# Patient Record
Sex: FEMALE | Race: WHITE | Employment: UNEMPLOYED | URBAN - METROPOLITAN AREA
[De-identification: names, ages, dates, MRNs, and addresses within clinical notes are randomized per-mention and may not be internally consistent; named-entity substitution may affect disease eponyms.]

---

## 2017-04-28 ENCOUNTER — TELEPHONE (OUTPATIENT)
Dept: FAMILY MEDICINE | Facility: CLINIC | Age: 47
End: 2017-04-28

## 2017-04-28 NOTE — TELEPHONE ENCOUNTER
4/28/2017    Call Regarding Preventive Health Screening Cervical/PAP    Attempt 1    Message on voicemail     Comments:         Outreach   Barbie Martinez

## 2017-06-09 NOTE — TELEPHONE ENCOUNTER
6/9/2017    Call Regarding Preventive Health Screening Cervical/PAP    Attempt 2    Message on voicemail     Comments:           Outreach   MITCH

## 2017-07-22 ENCOUNTER — HEALTH MAINTENANCE LETTER (OUTPATIENT)
Age: 47
End: 2017-07-22

## 2017-07-25 ENCOUNTER — OFFICE VISIT (OUTPATIENT)
Dept: INTERNAL MEDICINE | Facility: CLINIC | Age: 47
End: 2017-07-25
Payer: COMMERCIAL

## 2017-07-25 ENCOUNTER — TELEPHONE (OUTPATIENT)
Dept: FAMILY MEDICINE | Facility: CLINIC | Age: 47
End: 2017-07-25

## 2017-07-25 VITALS
WEIGHT: 151 LBS | HEART RATE: 107 BPM | SYSTOLIC BLOOD PRESSURE: 132 MMHG | DIASTOLIC BLOOD PRESSURE: 68 MMHG | BODY MASS INDEX: 28.77 KG/M2 | OXYGEN SATURATION: 97 % | RESPIRATION RATE: 14 BRPM | TEMPERATURE: 97.3 F

## 2017-07-25 DIAGNOSIS — F17.200 TOBACCO USE DISORDER: ICD-10-CM

## 2017-07-25 DIAGNOSIS — D50.9 IRON DEFICIENCY ANEMIA, UNSPECIFIED IRON DEFICIENCY ANEMIA TYPE: ICD-10-CM

## 2017-07-25 DIAGNOSIS — J20.9 ACUTE BRONCHITIS WITH SYMPTOMS > 10 DAYS: ICD-10-CM

## 2017-07-25 DIAGNOSIS — E05.90 THYROTOXICOSIS WITHOUT THYROID STORM, UNSPECIFIED THYROTOXICOSIS TYPE: Primary | ICD-10-CM

## 2017-07-25 LAB
ERYTHROCYTE [DISTWIDTH] IN BLOOD BY AUTOMATED COUNT: 13.5 % (ref 10–15)
HCT VFR BLD AUTO: 46.8 % (ref 35–47)
HGB BLD-MCNC: 15.3 G/DL (ref 11.7–15.7)
MCH RBC QN AUTO: 28.1 PG (ref 26.5–33)
MCHC RBC AUTO-ENTMCNC: 32.7 G/DL (ref 31.5–36.5)
MCV RBC AUTO: 86 FL (ref 78–100)
PLATELET # BLD AUTO: 190 10E9/L (ref 150–450)
RBC # BLD AUTO: 5.44 10E12/L (ref 3.8–5.2)
T4 FREE SERPL-MCNC: 3.5 NG/DL (ref 0.76–1.46)
TSH SERPL DL<=0.05 MIU/L-ACNC: <0.01 MU/L (ref 0.4–4)
WBC # BLD AUTO: 6.7 10E9/L (ref 4–11)

## 2017-07-25 PROCEDURE — 85027 COMPLETE CBC AUTOMATED: CPT | Performed by: INTERNAL MEDICINE

## 2017-07-25 PROCEDURE — 36415 COLL VENOUS BLD VENIPUNCTURE: CPT | Performed by: INTERNAL MEDICINE

## 2017-07-25 PROCEDURE — 84439 ASSAY OF FREE THYROXINE: CPT | Performed by: INTERNAL MEDICINE

## 2017-07-25 PROCEDURE — 84443 ASSAY THYROID STIM HORMONE: CPT | Performed by: INTERNAL MEDICINE

## 2017-07-25 PROCEDURE — 99213 OFFICE O/P EST LOW 20 MIN: CPT | Performed by: INTERNAL MEDICINE

## 2017-07-25 RX ORDER — AZITHROMYCIN 250 MG/1
TABLET, FILM COATED ORAL
Qty: 6 TABLET | Refills: 0 | Status: SHIPPED | OUTPATIENT
Start: 2017-07-25 | End: 2017-08-16

## 2017-07-25 ASSESSMENT — PAIN SCALES - GENERAL: PAINLEVEL: NO PAIN (0)

## 2017-07-25 NOTE — PROGRESS NOTES
SUBJECTIVE:                                                    Alessia Ramirez is a 46 year old female who presents to clinic today for the following health issues:    Acute Illness   Acute illness concerns: cough  Onset: last Thursday 20th    Fever: no     Chills/Sweats: YES    Headache (location?): YES    Sinus Pressure:no    Conjunctivitis:  no    Ear Pain: YES- itchy    Rhinorrhea: YES    Congestion: YES    Sore Throat: no      Cough: YES    Wheeze: YES    Decreased Appetite: no     Nausea: no     Vomiting: YES    Diarrhea:  no     Dysuria/Freq.: no     Fatigue/Achiness: YES    Sick/Strep Exposure: no      Therapies Tried and outcome: nothing    Sob at work, been a week or so.  Some wheeze, not much sputum.      Past Medical History:   Diagnosis Date     Grave's disease 2007     IRON DEFIC ANEMIA NOS 6/11/2007    Suggested iron tabs     Current Outpatient Prescriptions   Medication     loratadine (ALLERGY RELIEF) 10 MG tablet     methimazole (TAPAZOLE) 10 MG tablet     Blood Pressure Monitoring (BLOOD PRESSURE MONITOR/M CUFF) MISC     ibuprofen (ADVIL,MOTRIN) 200 MG tablet     Pseudoephedrine HCl (SINUS DECONGESTANT PO)     albuterol (PROAIR HFA, PROVENTIL HFA, VENTOLIN HFA) 108 (90 BASE) MCG/ACT inhaler     No current facility-administered medications for this visit.      Physical Exam  /68 (BP Location: Right arm, Patient Position: Sitting, Cuff Size: Adult Regular)  Pulse 107  Temp 97.3  F (36.3  C) (Temporal)  Resp 14  Wt 151 lb (68.5 kg)  LMP 07/17/2017  SpO2 97%  BMI 28.77 kg/m2  General Appearance-alert, moderate distress  ENT-ENT exam normal, no neck nodes or sinus tenderness  Cardiac-regular rate and rhythm  with normal S1, S2 ; no murmur, rub or gallops  Lungs-mild expiratory rhonchi and mild expiratory wheezes    ASSESSMENT:  Patient have not seen in over a year. She continues to be on time is all for hyper thyroidism. She is due for labs which we'll do today. She does have some  hyperactive hyper anxiety sensations. She is currently sick with bronchitis. Possible viral versus bacterial. We will treat her with a Z-Luciano which should cover her lungs. She does need to quit smoking she is having more wheezing and lung disease. Patient had a quite long coughing attack while in the clinic she should improve with the use of the inhaler although she is nervous about using this she does have albuterol inhaler at home. The Z-Luciano should help clear this up. If she is not improving [place] prednisone for the anti-inflammatory effect.    Electronically signed by Linden Yañez MD

## 2017-07-25 NOTE — TELEPHONE ENCOUNTER
"  Alessia Ramirez is a 46 year old female who calls with concerns about cold symptoms and \"hard time catching my breath.\"  Patient reports over the past couple weeks dealing with cold symptoms.  She reports that on Thursday she lost her voice, Friday she started with chills, sweats, and body aches.  Reports cough that is non-productive.  Reports that she has coughed hard enough to cause her to vomit a couple times.  Reports that after she coughs, she reports a \"hard time catching her breath.\"  She is not constantly short of breath.  Patient reports nasal congestion, some clear nasal drainage.  Reports that she feels weak.  She is getting fluids in, decrease appetite.  States that she is taking OTC medications, but these have not been very helpful related to symptoms.  Patient denies fevers, chest pains.       NURSING ASSESSMENT:  Description:  Cold, cough.   Onset/duration:  Past couple weeks.   Associated symptoms:  Nasal congestion, weakness, decrease appetite.   Improves/worsens symptoms:  OTC medications not helpful.   Pain scale (0-10)   2/10  Last exam/Treatment:  02/03/2016  Allergies: No Known Allergies    NURSING PLAN: Routed to provider Yes    RECOMMENDED DISPOSITION:  Patient is requesting appointment with PCP.  She denies need for emergent care.  Patient was encouraged to seek emergent care for worsening of symptoms, or chest pains.  She again denied need for emergent care and is requesting appointment with PCP.   Will comply with recommendation: Yes  If further questions/concerns or if symptoms do not improve, worsen or new symptoms develop, call your PCP or [facility] as soon as possible.    Guideline used:  Breathing difficulties.   Telephone Triage Protocols for Nurses, Fifth Edition, Ayla Shaikh RN  "

## 2017-07-25 NOTE — LETTER
[address] [address]  Phone: [phone]    July 25, 2017        Alessia Ramirez  [address] [facility] [place] [address]          To whom it may concern:    RE: Alessia Ramirez    Patient was seen and treated today at our clinic and missed work.    Please contact me for questions or concerns.      Sincerely,        Linden Yañez MD

## 2017-07-25 NOTE — NURSING NOTE
"Chief Complaint   Patient presents with     Cough       Initial /68 (BP Location: Right arm, Patient Position: Sitting, Cuff Size: Adult Regular)  Pulse 107  Temp 97.3  F (36.3  C) (Temporal)  Resp 14  Wt 151 lb (68.5 kg)  LMP 07/17/2017  SpO2 97%  BMI 28.77 kg/m2 Estimated body mass index is 28.77 kg/(m^2) as calculated from the following:    Height as of 1/12/16: 5' 0.75\" (1.543 m).    Weight as of this encounter: 151 lb (68.5 kg).  Medication Reconciliation: complete   Camila Oliva MA 7/25/2017        "

## 2017-07-25 NOTE — MR AVS SNAPSHOT
"              After Visit Summary   7/25/2017    Alessia Ramirez    MRN: [id number]           Patient Information     Date Of Birth          1970        Visit Information        Provider Department      7/25/2017 3:45 PM Linden Yañez MD [facility]        Today's Diagnoses     Thyrotoxicosis without thyroid storm, unspecified thyrotoxicosis type    -  1    Tobacco use disorder        Iron deficiency anemia, unspecified iron deficiency anemia type        Acute bronchitis with symptoms > 10 days           Follow-ups after your visit        Who to contact     If you have questions or need follow up information about today's clinic visit or your schedule please contact [facility] directly at [phone].  Normal or non-critical lab and imaging results will be communicated to you by MyChart, letter or phone within 4 business days after the clinic has received the results. If you do not hear from us within 7 days, please contact the clinic through MyChart or phone. If you have a critical or abnormal lab result, we will notify you by phone as soon as possible.  Submit refill requests through [employer] or call your pharmacy and they will forward the refill request to us. Please allow 3 business days for your refill to be completed.          Additional Information About Your Visit        MyChart Information     V I O lets you send messages to your doctor, view your test results, renew your prescriptions, schedule appointments and more. To sign up, go to [email or web address] . Click on \"Log in\" on the left side of the screen, which will take you to the Welcome page. Then click on \"Sign up Now\" on the right side of the page.     You will be asked to enter the access code listed below, as well as some personal information. Please follow the directions to create your username and password.     Your access code is: [email or web address]  Expires: 10/23/2017  5:25 PM     Your access code will "  in 90 days. If you need help or a new code, please call your [place] clinic or [phone].        Care EveryWhere ID     This is your Care EveryWhere ID. This could be used by other organizations to access your [place] medical records  [id number]        Your Vitals Were     Pulse Temperature Respirations Last Period Pulse Oximetry BMI (Body Mass Index)    107 97.3  F (36.3  C) (Temporal) 14 2017 97% 28.77 kg/m2       Blood Pressure from Last 3 Encounters:   17 132/68   16 118/66   16 122/66    Weight from Last 3 Encounters:   17 151 lb (68.5 kg)   16 144 lb (65.3 kg)   16 150 lb (68 kg)              We Performed the Following     CBC with platelets     T4 free     TSH          Where to get your medicines      These medications were sent to [facility], [address]   [address] [facility] [address]     Phone:  [phone]     azithromycin 250 MG tablet          Primary Care Provider Office Phone # Fax #    Linden Yañez [phone]       [place] [address] [facility]   [facility] [address]        Equal Access to Services     LETICIA KERNS AH: Hadii aad ku hadasho Soomaali, waaxda luqadaha, qaybta kaalmada adeegyada, waxay idiin hayromann juan cardenas lasandra mccray. So [facility] [phone].    ATENCIÓN: Si habla español, tiene a brown disposición servicios gratuitos de asistencia lingüística. Llame al [phone].    We comply with applicable federal civil rights laws and [place] laws. We do not discriminate on the basis of race, color, national origin, age, disability sex, sexual orientation or gender identity.            Thank you!     Thank you for choosing [facility]  for your care. Our goal is always to provide you with excellent care. Hearing back from our patients is one way we can continue to improve our services. Please take a few minutes to complete the written survey that you may  receive in the mail after your visit with us. Thank you!             Your Updated Medication List - Protect others around you: Learn how to safely use, store and throw away your medicines at www.disposemymeds.org.          This list is accurate as of: 7/25/17  5:25 PM.  Always use your most recent med list.                   Brand Name Dispense Instructions for use Diagnosis    albuterol 108 (90 BASE) MCG/ACT Inhaler    PROAIR HFA/PROVENTIL HFA/VENTOLIN HFA    1 Inhaler    Inhale 2 puffs into the lungs every 6 hours as needed for shortness of breath / dyspnea or wheezing    Cough       ALLERGY RELIEF 10 MG tablet   Generic drug:  loratadine      Take 10 mg by mouth daily        azithromycin 250 MG tablet    ZITHROMAX    6 tablet    Two tablets first day, then one tablet daily for four days.    Acute bronchitis with symptoms > 10 days       blood pressure monitor/m cuff Misc     1 Device    1 Device daily as needed    Elevated blood pressure reading without diagnosis of hypertension       ibuprofen 200 MG tablet    ADVIL/MOTRIN     Take 200 mg by mouth as needed.        methimazole 10 MG tablet    TAPAZOLE    30 tablet    Take 1 tablet (10 mg) by mouth daily    Hyperthyroidism       SINUS DECONGESTANT PO

## 2017-07-26 ENCOUNTER — TELEPHONE (OUTPATIENT)
Dept: INTERNAL MEDICINE | Facility: CLINIC | Age: 47
End: 2017-07-26

## 2017-07-26 NOTE — TELEPHONE ENCOUNTER
----- Message from Linden Yañez MD sent at 7/25/2017  8:26 PM CDT -----  Thyroid is overactive causing her anxiety and hyperexcitabilty, needs another visit with me to discuss in the next few weeks.

## 2017-07-26 NOTE — TELEPHONE ENCOUNTER
Reason for Call:  Request for results:    Name of test or procedure: labs    Date of test of procedure: 7/25/17    Location of the test or procedure: [facility]     OK to leave the result message on voice mail or with a family member? YES    Phone number Patient can be reached at:  Other phone number:  [phone]    Additional comments: patient would like to get results from her lab work that was done yesterday, please call and advise    Call taken on 7/26/2017 at 8:51 AM by Becky Argueta

## 2017-08-14 ENCOUNTER — TELEPHONE (OUTPATIENT)
Dept: INTERNAL MEDICINE | Facility: CLINIC | Age: 47
End: 2017-08-14

## 2017-08-16 ENCOUNTER — OFFICE VISIT (OUTPATIENT)
Dept: INTERNAL MEDICINE | Facility: CLINIC | Age: 47
End: 2017-08-16
Payer: COMMERCIAL

## 2017-08-16 VITALS
HEART RATE: 110 BPM | WEIGHT: 155.8 LBS | RESPIRATION RATE: 16 BRPM | SYSTOLIC BLOOD PRESSURE: 138 MMHG | DIASTOLIC BLOOD PRESSURE: 68 MMHG | OXYGEN SATURATION: 98 % | TEMPERATURE: 97.3 F | BODY MASS INDEX: 29.68 KG/M2

## 2017-08-16 DIAGNOSIS — E05.90 THYROTOXICOSIS WITHOUT THYROID STORM, UNSPECIFIED THYROTOXICOSIS TYPE: Primary | ICD-10-CM

## 2017-08-16 DIAGNOSIS — S93.402A SPRAIN OF LEFT ANKLE, UNSPECIFIED LIGAMENT, INITIAL ENCOUNTER: ICD-10-CM

## 2017-08-16 PROCEDURE — 99213 OFFICE O/P EST LOW 20 MIN: CPT | Performed by: INTERNAL MEDICINE

## 2017-08-16 ASSESSMENT — PAIN SCALES - GENERAL: PAINLEVEL: MODERATE PAIN (4)

## 2017-08-16 NOTE — NURSING NOTE
"Chief Complaint   Patient presents with     ankle injury       Initial /68  Pulse 110  Temp 97.3  F (36.3  C) (Temporal)  Resp 16  Wt 155 lb 12.8 oz (70.7 kg)  LMP 07/17/2017  SpO2 98%  BMI 29.68 kg/m2 Estimated body mass index is 29.68 kg/(m^2) as calculated from the following:    Height as of 1/12/16: 5' 0.75\" (1.543 m).    Weight as of this encounter: 155 lb 12.8 oz (70.7 kg).  Medication Reconciliation: complete  Lucrecia Schmitt CMA (AAMA)      "

## 2017-08-16 NOTE — PROGRESS NOTES
SUBJECTIVE:                                                    Alessia Ramirez is a 46 year old female who presents to clinic today for the following health issues:    Joint Pain    Onset: last night    Description:   Location: left ankle  Character: Stabbing    Intensity: mild-moderate    Progression of Symptoms: intermittent    Accompanying Signs & Symptoms:  Other symptoms: swelling    History:   Previous similar pain: no       Precipitating factors:   Trauma or overuse: YES    Alleviating factors:  Improved by: ice and elevation    Therapies Tried and outcome: ice, elevation, tylenol      Last night left foot was numb for no reason, then fell as she got out of the car.    She is able to walk, denies point tenderness.    Physical Exam  /68  Pulse 110  Temp 97.3  F (36.3  C) (Temporal)  Resp 16  Wt 155 lb 12.8 oz (70.7 kg)  LMP 07/17/2017  SpO2 98%  BMI 29.68 kg/m2  General Appearance-healthy, alert, no distress swelling in both ankles and lower extremities, pitting edema. Left ankle has decent range of motion, no point tenderness over the malleolus more lower in her foot.          ASSESSMENT:  Left ankle sprain without any signs of fracture. The patient does have hyperthyroidism which may have caused some of the numbness. Certainly causing swelling in her lower extremities. I've offered her beta blocker and methimazole which she refuses. We will be setting up sooner for a long visit next week or probable radioactive ablation.

## 2017-08-16 NOTE — MR AVS SNAPSHOT
After Visit Summary   8/16/2017    Alessia Ramirez    MRN: [id number]           Patient Information     Date Of Birth          1970        Visit Information        Provider Department      8/16/2017 9:15 AM Linden Yañez MD [facility]         Follow-ups after your visit        Your next 10 appointments already scheduled     Aug 16, 2017  9:15 AM CDT   Office Visit with Linden Yañez MD   [facility] ([address] [id number] [phone]           Bring a current list of meds and any records pertaining to this visit. For Physicals, please bring immunization records and any forms needing to be filled out. Please arrive 10 minutes early to complete paperwork.            Aug 21, 2017  1:00 PM CDT   Office Visit with Linden Yañez MD   [facility] ([address] [id number] [phone]           Bring a current list of meds and any records pertaining to this visit. For Physicals, please bring immunization records and any forms needing to be filled out. Please arrive 10 minutes early to complete paperwork.              Who to contact     If you have questions or need follow up information about today's clinic visit or your schedule please contact [facility] directly at [phone].  Normal or non-critical lab and imaging results will be communicated to you by MyChart, letter or phone within 4 business days after the clinic has received the results. If you do not hear from us within 7 days, please contact the clinic through MyChart or phone. If you have a critical or abnormal lab result, we will notify you by phone as soon as possible.  Submit refill requests through [employer] or call your pharmacy and they will forward the refill request to us. Please allow 3 business days for your refill to be completed.          Additional  "Information About Your Visit        MyChart Information     MMIS lets you send messages to your doctor, view your test results, renew your prescriptions, schedule appointments and more. To sign up, go to [email or web address] . Click on \"Log in\" on the left side of the screen, which will take you to the Welcome page. Then click on \"Sign up Now\" on the right side of the page.     You will be asked to enter the access code listed below, as well as some personal information. Please follow the directions to create your username and password.     Your access code is: [email or web address]  Expires: 10/23/2017  5:25 PM     Your access code will  in 90 days. If you need help or a new code, please call your [place] clinic or [phone].        Care EveryWhere ID     This is your Care EveryWhere ID. This could be used by other organizations to access your [place] medical records  [id number]        Your Vitals Were     Pulse Temperature Respirations Last Period Pulse Oximetry BMI (Body Mass Index)    110 97.3  F (36.3  C) (Temporal) 16 2017 98% 29.68 kg/m2       Blood Pressure from Last 3 Encounters:   17 138/68   17 132/68   16 118/66    Weight from Last 3 Encounters:   17 155 lb 12.8 oz (70.7 kg)   17 151 lb (68.5 kg)   16 144 lb (65.3 kg)              Today, you had the following     No orders found for display       Primary Care Provider Office Phone # Fax #    Linden Yañez [phone]       [facility] [address] [place] [address]        Equal Access to Services     [facility] SAUMYA : Geovanna Montalvo, bolivar daily, chance lebronalmada kailey, frankie mccray. So [facility] [phone].    ATENCIÓN: Si habla español, tiene a brown disposición servicios gratuitos de asistencia lingüística. Llame al [phone].    We comply with applicable federal civil rights laws and [place] laws. We do not " discriminate on the basis of race, color, national origin, age, disability sex, sexual orientation or gender identity.            Thank you!     Thank you for choosing [facility]  for your care. Our goal is always to provide you with excellent care. Hearing back from our patients is one way we can continue to improve our services. Please take a few minutes to complete the written survey that you may receive in the mail after your visit with us. Thank you!             Your Updated Medication List - Protect others around you: Learn how to safely use, store and throw away your medicines at www.disposemymeds.org.          This list is accurate as of: 8/16/17  8:49 AM.  Always use your most recent med list.                   Brand Name Dispense Instructions for use Diagnosis    albuterol 108 (90 BASE) MCG/ACT Inhaler    PROAIR HFA/PROVENTIL HFA/VENTOLIN HFA    1 Inhaler    Inhale 2 puffs into the lungs every 6 hours as needed for shortness of breath / dyspnea or wheezing    Cough       ALLERGY RELIEF 10 MG tablet   Generic drug:  loratadine      Take 10 mg by mouth daily        blood pressure monitor/m cuff Misc     1 Device    1 Device daily as needed    Elevated blood pressure reading without diagnosis of hypertension       ibuprofen 200 MG tablet    ADVIL/MOTRIN     Take 200 mg by mouth as needed.        methimazole 10 MG tablet    TAPAZOLE    30 tablet    Take 1 tablet (10 mg) by mouth daily    Hyperthyroidism       SINUS DECONGESTANT PO

## 2018-02-19 ENCOUNTER — TELEPHONE (OUTPATIENT)
Dept: FAMILY MEDICINE | Facility: CLINIC | Age: 48
End: 2018-02-19

## 2018-02-19 NOTE — TELEPHONE ENCOUNTER
Influenza-Like Illness (MITESH) Protocol    Alessia Ramirez      Age: 47 year old     YOB: 1970    Are you currently sick or have you had close contact with someone who is currently sick?   This patient is not currently sick but has had close contact with someone who was.      Evaluation for Possible Influenza Exposure  (ADULTS)    Below are conditions which place adults at increased risk for the more severe complications of influenza.    Does this patient have ANY of the following conditions?  Chronic pulmonary disease such as asthma or COPD No   Heart disease (CHF, CAD, anticoag due to arrhythmia) No   Liver disease (hepatitis, liver failure, cirrhosis) No   Kidney disease (renal failure, insufficiency or dialysis) No   Metabolic disorder (e.g. diabetes) No   Neuromuscular disorder (DANIEL PRESSLEY MD, myasthenia gravis) No   Compromised ability to handle respiratory secretions No   Hematologic disorder (e.g. sickle cell disease) No   HIV / AIDS No   Chemotherapy or radiation within the last 3 months No   Received an organ or a bone marrow transplant No   Taking Prednisone in excess of 20mg daily No   Is age 65 years or older No   Is pregnant, thinks she may be pregnant or is within two weeks after delivery No   Is a resident of a chronic care facility No   Is patient  or [place] native  No     Nursing Plan  Does this patient have ANY of the above conditions?    No.      Provided home care instructions    If further questions/concerns or if new symptoms develop, call your PCP or [facility] as soon as possible.    When to seek medical attention    Contact your health care provider right away if you experience:    A painful sore throat accompanied by fever persists for more than 48 hours    Ear pain, sinus pain, persistent vomiting and/or diarrhea    Oral temperature greater than 104  Fahrenheit (40  Celsius)    Dehydration (e.g., mouth feeling dry, dizzy when sitting/standing,  decreased urine output)    Severe or persistent vomiting; unable to keep fluids down    Improvement in flu-like symptoms (fever and cough or sore throat) but then return of fever and worse cough or sore throat    Not drinking enough fluid    Any other concerns not stated above      Additional educational resources include:    http://www.[employer].com    http://www.cdc.gov/flu/  Kirti Santacruz

## 2018-04-07 ENCOUNTER — NURSE TRIAGE (OUTPATIENT)
Dept: NURSING | Facility: CLINIC | Age: 48
End: 2018-04-07

## 2018-04-07 NOTE — TELEPHONE ENCOUNTER
24 hours ago , broke  part a tooth, on bottom  after eating a nut in candy .  Today having swelling and  severe pain . Triage for tooth injury and wound infection - adult with disposition be seen by provider in 4 hours and given option of location and checked and her on-call provider in no on call .  Will go in to be seen .    .Stormy Montalvo RN [facility].     Reason for Disposition    [1] Skin around the wound has become red AND [2] larger than 2 inches (5 cm)    Additional Information    Negative: Knocked out (unconscious) > 1 minute    Negative: Difficult to awaken or acting confused  (e.g., disoriented, slurred speech)    Negative: Severe neck pain    Negative: Sounds like a life-threatening emergency to the triager    Wound looks infected    Negative: [1] Widespread rash AND [2] bright red, sunburn-like AND [3] too weak to stand    Negative: Sounds like a life-threatening emergency to the triager    Negative: Stitches (or staples) and  not  infected    Negative: Skin glue used to close wound and not infected    Negative:  surgical wound infection suspected    Negative: Surgical wound infection suspected (post-op)    Negative: Animal bite wound infection suspected    Negative: [1] Widespread rash AND [2] bright red, sunburn-like    Negative: Severe pain in the wound    Negative: Black (necrotic) or blisters develop in wound    Negative: Patient sounds very sick or weak to the triager    Negative: [1] Looks infected (spreading redness, red streak, pus) AND [2] fever    Negative: [1] Red streak runs from the wound AND [2] longer than 1 inch (2.5 cm)    Protocols used: WOUND INFECTION-ADULT-, TOOTH INJURY-ADULT-

## 2020-02-04 ENCOUNTER — TELEPHONE (OUTPATIENT)
Dept: INTERNAL MEDICINE | Facility: CLINIC | Age: 50
End: 2020-02-04

## 2020-02-04 NOTE — TELEPHONE ENCOUNTER
Yes ok to take nicotine patches, use lowest dose of 7 mg a day, take off at night.  Take her medications the day of procedure.

## 2020-02-04 NOTE — TELEPHONE ENCOUNTER
Reason for Call:  Other call back    Detailed comments:  Patient calling stating she is having a procedure to have teeth pulled and cannot smoke afterwards. States she is unsure of what she take along side with her graves disease. Are patches an option. Patient also wondering what medications she can or cannot take while undergoing her procedure. Please advise     Phone Number Patient can be reached at: Home number on file [phone]    Best Time:      Can we leave a detailed message on this number? YES    Call taken on 2/4/2020 at 9:31 AM by Siria English

## 2020-02-04 NOTE — TELEPHONE ENCOUNTER
The patient called back and information has been given.   Thank you,  Magali Lamb   for [facility]

## 2020-02-05 ENCOUNTER — NURSE TRIAGE (OUTPATIENT)
Dept: NURSING | Facility: CLINIC | Age: 50
End: 2020-02-05

## 2020-02-05 NOTE — TELEPHONE ENCOUNTER
Alessia calls back.  States insurance does not cover any sedation for this dental procedure.  Is asking if Dr Yañez will order something for her to take for the day of procedure, that will help relax her and calm her fear.   Uses [facility] pharmacy.

## 2020-02-06 NOTE — TELEPHONE ENCOUNTER
"Patient is returning a call from the clinic.    Relayed the message below left by Dr Yañez in the chart:    \"No that has to come from the dental team.\"    Patient then asking if Dr Yañez has any sooner appointments then 2/18/2020.     Warm transferred to scheduling.     Kym Tai, RN/M [facility]    Reason for Disposition    [1] Follow-up call to recent contact AND [2] information only call, no triage required    Protocols used: INFORMATION ONLY CALL-A-AH      "

## 2020-02-06 NOTE — TELEPHONE ENCOUNTER
Tried to reach patient, left message for patient to call the clinic back.    Evie Abraham, [facility]

## 2020-02-26 ENCOUNTER — OFFICE VISIT (OUTPATIENT)
Dept: INTERNAL MEDICINE | Facility: CLINIC | Age: 50
End: 2020-02-26
Payer: COMMERCIAL

## 2020-02-26 ENCOUNTER — TELEPHONE (OUTPATIENT)
Dept: ENDOCRINOLOGY | Facility: CLINIC | Age: 50
End: 2020-02-26

## 2020-02-26 VITALS
BODY MASS INDEX: 28.07 KG/M2 | WEIGHT: 143 LBS | OXYGEN SATURATION: 99 % | DIASTOLIC BLOOD PRESSURE: 66 MMHG | RESPIRATION RATE: 16 BRPM | TEMPERATURE: 97.3 F | HEIGHT: 60 IN | SYSTOLIC BLOOD PRESSURE: 128 MMHG | HEART RATE: 100 BPM

## 2020-02-26 DIAGNOSIS — E05.90 HYPERTHYROIDISM: ICD-10-CM

## 2020-02-26 DIAGNOSIS — E05.90 THYROTOXICOSIS WITHOUT THYROID STORM, UNSPECIFIED THYROTOXICOSIS TYPE: Primary | ICD-10-CM

## 2020-02-26 DIAGNOSIS — K02.9 DENTAL DECAY: ICD-10-CM

## 2020-02-26 LAB
ALBUMIN SERPL-MCNC: 3.9 G/DL (ref 3.4–5)
ALP SERPL-CCNC: 135 U/L (ref 40–150)
ALT SERPL W P-5'-P-CCNC: 26 U/L (ref 0–50)
ANION GAP SERPL CALCULATED.3IONS-SCNC: 4 MMOL/L (ref 3–14)
AST SERPL W P-5'-P-CCNC: 18 U/L (ref 0–45)
BILIRUB SERPL-MCNC: 0.3 MG/DL (ref 0.2–1.3)
BUN SERPL-MCNC: 10 MG/DL (ref 7–30)
CALCIUM SERPL-MCNC: 9.2 MG/DL (ref 8.5–10.1)
CHLORIDE SERPL-SCNC: 109 MMOL/L (ref 94–109)
CO2 SERPL-SCNC: 26 MMOL/L (ref 20–32)
CREAT SERPL-MCNC: 0.43 MG/DL (ref 0.52–1.04)
ERYTHROCYTE [DISTWIDTH] IN BLOOD BY AUTOMATED COUNT: 12.8 % (ref 10–15)
GFR SERPL CREATININE-BSD FRML MDRD: >90 ML/MIN/{1.73_M2}
GLUCOSE SERPL-MCNC: 89 MG/DL (ref 70–99)
HCT VFR BLD AUTO: 48.1 % (ref 35–47)
HGB BLD-MCNC: 15.8 G/DL (ref 11.7–15.7)
MCH RBC QN AUTO: 29 PG (ref 26.5–33)
MCHC RBC AUTO-ENTMCNC: 32.8 G/DL (ref 31.5–36.5)
MCV RBC AUTO: 88 FL (ref 78–100)
PLATELET # BLD AUTO: 219 10E9/L (ref 150–450)
POTASSIUM SERPL-SCNC: 4.2 MMOL/L (ref 3.4–5.3)
PROT SERPL-MCNC: 8.1 G/DL (ref 6.8–8.8)
RBC # BLD AUTO: 5.45 10E12/L (ref 3.8–5.2)
SODIUM SERPL-SCNC: 139 MMOL/L (ref 133–144)
T4 FREE SERPL-MCNC: 2.43 NG/DL (ref 0.76–1.46)
TSH SERPL DL<=0.005 MIU/L-ACNC: <0.01 MU/L (ref 0.4–4)
WBC # BLD AUTO: 8.2 10E9/L (ref 4–11)

## 2020-02-26 PROCEDURE — 85027 COMPLETE CBC AUTOMATED: CPT | Performed by: INTERNAL MEDICINE

## 2020-02-26 PROCEDURE — 36415 COLL VENOUS BLD VENIPUNCTURE: CPT | Performed by: INTERNAL MEDICINE

## 2020-02-26 PROCEDURE — 99214 OFFICE O/P EST MOD 30 MIN: CPT | Performed by: INTERNAL MEDICINE

## 2020-02-26 PROCEDURE — 86376 MICROSOMAL ANTIBODY EACH: CPT | Performed by: INTERNAL MEDICINE

## 2020-02-26 PROCEDURE — 84445 ASSAY OF TSI GLOBULIN: CPT | Mod: 90 | Performed by: INTERNAL MEDICINE

## 2020-02-26 PROCEDURE — 84439 ASSAY OF FREE THYROXINE: CPT | Performed by: INTERNAL MEDICINE

## 2020-02-26 PROCEDURE — 84443 ASSAY THYROID STIM HORMONE: CPT | Performed by: INTERNAL MEDICINE

## 2020-02-26 PROCEDURE — 99000 SPECIMEN HANDLING OFFICE-LAB: CPT | Performed by: INTERNAL MEDICINE

## 2020-02-26 PROCEDURE — 80053 COMPREHEN METABOLIC PANEL: CPT | Performed by: INTERNAL MEDICINE

## 2020-02-26 RX ORDER — METHIMAZOLE 10 MG/1
10 TABLET ORAL DAILY
Qty: 30 TABLET | Refills: 3 | Status: SHIPPED | OUTPATIENT
Start: 2020-02-26 | End: 2020-03-30

## 2020-02-26 RX ORDER — METOPROLOL SUCCINATE 25 MG/1
25 TABLET, EXTENDED RELEASE ORAL DAILY
Qty: 30 TABLET | Refills: 1 | Status: SHIPPED | OUTPATIENT
Start: 2020-02-26 | End: 2020-03-30

## 2020-02-26 ASSESSMENT — PAIN SCALES - GENERAL: PAINLEVEL: NO PAIN (0)

## 2020-02-26 ASSESSMENT — MIFFLIN-ST. JEOR: SCORE: 1189.11

## 2020-02-26 NOTE — TELEPHONE ENCOUNTER
[facility] Call Center    Phone Message    May a detailed message be left on voicemail: yes     Reason for Call: Other: [place] called and scheduled the ENDO visit. we offered march 2nd to the patient and she did not want it. FYI message  she is scheduled for 4/27/2020    Action Taken: Message routed to:  Adult Clinics: Endocrinology p [phone]

## 2020-02-26 NOTE — PROGRESS NOTES
"Subjective     Alessia Ramirez is a 49 year old female who presents to clinic today for the following health issues:    HPI   Chief Complaint   Patient presents with     Thyroid Disease     f/u     Anxiety     has some questions     She is doing ok, having lots of energy she says.  Hasn't been seen for two years.    Needs some dental work and teeth removed.  Afraid of needles.  Needs 17 teeth removed.       She has been hyperthyroid without methimazole. Feels like ADHD and having anxiety.      Past Medical History:   Diagnosis Date     Grave's disease 2007     IRON DEFIC ANEMIA NOS 6/11/2007    Suggested iron tabs     Current Outpatient Medications   Medication     methimazole (TAPAZOLE) 10 MG tablet     metoprolol succinate ER (TOPROL-XL) 25 MG 24 hr tablet     albuterol (PROAIR HFA, PROVENTIL HFA, VENTOLIN HFA) 108 (90 BASE) MCG/ACT inhaler     Blood Pressure Monitoring (BLOOD PRESSURE MONITOR/M CUFF) MISC     ibuprofen (ADVIL,MOTRIN) 200 MG tablet     loratadine (ALLERGY RELIEF) 10 MG tablet     Pseudoephedrine HCl (SINUS DECONGESTANT PO)     No current facility-administered medications for this visit.      Social History     Tobacco Use     Smoking status: Current Every Day Smoker     Packs/day: 1.00     Types: Cigarettes     Smokeless tobacco: Never Used     Tobacco comment: 15 cigarettes per day   Substance Use Topics     Alcohol use: Yes     Comment: rare     Drug use: No     Review of Systems  Constitutional-No fevers, chills, or weight changes. and lots of energy.  ENT-tooth problems .  Cardiac-No chest pain or palpitations.  Respiratory-No cough, sob, or hemoptysis.  GI-No nausea, vomitting, diarrhea, constipation, or blood in the stool.  Musculoskeletal-No muscles aches or joint pains.    Physical Exam  /66   Pulse 100   Temp 97.3  F (36.3  C) (Temporal)   Resp 16   Ht 1.53 m (5' 0.25\")   Wt 64.9 kg (143 lb)   SpO2 99%   BMI 27.70 kg/m    General Appearance-healthy, alert, no distress, " anxious, active   ENT-broken teeth upper and lower   Cardiac-regular rate and rhythm  with normal S1, S2 ; no murmur, rub or gallops  Lungs-clear to auscultation  Neurological-reflexes are 2+     ASSESSMENT:  49-year-old female who has hyperthyroidism, Graves' disease with positive antibodies back in 2017.  She used to be on methimazole.  However she has been all her insurance for 2 years and not taking anything.  Her  thinks she has ADHD or bipolar.  We discussed that this is likely her hyperthyroidism.  She has lots of activity and energy.  She knows that she needs to get this treated and is agreement to radioactive iodine.  I like her to see endocrinology.  We will do a referral today.  In the meantime I will restart her methimazole if she cannot get in soon and I will start her on 25 mg of Toprol to try to bring down her heart rate and energy.    She also has tooth decay and disease.  She needs to have all of her teeth pulled and get dentures.  She knows this.  Unfortunately her dentist will only do this under local.  She is afraid of needles and needs to have some other sedation will try to refer her to the [facility] for monitored tooth removal.    Electronically signed by Linden Yañez MD

## 2020-02-26 NOTE — TELEPHONE ENCOUNTER
As noted below patient was offered appointment for Monday 3/2/20 but scheduled for 4/27/20.      Patient saw her primary care provider, Dr. Yañez, today 2/26/20. Labs from visit are still in process.     Per Dr. Yañez's progress note:  ASSESSMENT:  49-year-old female who has hyperthyroidism, Graves' disease with positive antibodies back in 2017.  She used to be on methimazole.  However she has been all her insurance for 2 years and not taking anything.  Her  thinks she has ADHD or bipolar.  We discussed that this is likely her hyperthyroidism.  She has lots of activity and energy.  She knows that she needs to get this treated and is agreement to radioactive iodine.  I like her to see endocrinology.  We will do a referral today.  In the meantime I will restart her methimazole if she cannot get in soon and I will start her on 25 mg of Toprol to try to bring down her heart rate and energy.        Will await patient lab results.       Yue Nicholas RN  Endocrine Care Coordinator  [facility]

## 2020-02-26 NOTE — TELEPHONE ENCOUNTER
Please see below for current labs that are back. Attempted to contact patient again to offer appointment Monday, 3/2/20, with Dr. Gomez (several openings). Left voicemail for patient to contact our office.     Yue Nicholas, RN  Endocrine Care Coordinator  [facility]      Component      Latest Ref Rng & Units 2/26/2020   Sodium      133 - 144 mmol/L 139   Potassium      3.4 - 5.3 mmol/L 4.2   Chloride      94 - 109 mmol/L 109   Carbon Dioxide      20 - 32 mmol/L 26   Anion Gap      3 - 14 mmol/L 4   Glucose      70 - 99 mg/dL 89   Urea Nitrogen      7 - 30 mg/dL 10   Creatinine      0.52 - 1.04 mg/dL 0.43 (L)   GFR Estimate      >60 mL/min/1.73:m2 >90   GFR Estimate If Black      >60 mL/min/1.73:m2 >90   Calcium      8.5 - 10.1 mg/dL 9.2   Bilirubin Total      0.2 - 1.3 mg/dL 0.3   Albumin      3.4 - 5.0 g/dL 3.9   Protein Total      6.8 - 8.8 g/dL 8.1   Alkaline Phosphatase      40 - 150 U/L 135   ALT      0 - 50 U/L 26   AST      0 - 45 U/L 18   WBC      4.0 - 11.0 10e9/L 8.2   RBC Count      3.8 - 5.2 10e12/L 5.45 (H)   Hemoglobin      11.7 - 15.7 g/dL 15.8 (H)   Hematocrit      35.0 - 47.0 % 48.1 (H)   MCV      78 - 100 fl 88   MCH      26.5 - 33.0 pg 29.0   MCHC      31.5 - 36.5 g/dL 32.8   RDW      10.0 - 15.0 % 12.8   Platelet Count      150 - 450 10e9/L 219   TSH      0.40 - 4.00 mU/L <0.01 (L)   T4 Free      0.76 - 1.46 ng/dL 2.43 (H)

## 2020-02-27 LAB — THYROPEROXIDASE AB SERPL-ACNC: 1701 IU/ML

## 2020-02-27 NOTE — TELEPHONE ENCOUNTER
2.27.20  Patient called dept back regarding opening with Dr Gomez on 3.2.20.  She will check with her ride and see if that will work.  Pt will call back.

## 2020-03-04 ENCOUNTER — TELEPHONE (OUTPATIENT)
Dept: INTERNAL MEDICINE | Facility: CLINIC | Age: 50
End: 2020-03-04

## 2020-03-04 LAB — TSI SER-ACNC: 2 TSI INDEX

## 2020-03-05 NOTE — TELEPHONE ENCOUNTER
Patient was informed that her thyroid antibodies are high these are causing her thyroid to be overactive. She needs to continue to see endocrinology. She should continue the metoprolol and the methimazole. Patient was scared to start the metoprolol. Patient was advised that per Dr. Yañez's notes it states that he has prescribed it for her to help to try to bring down her heart rate and energy.    Evie Abraham, CMA

## 2020-03-05 NOTE — TELEPHONE ENCOUNTER
----- Message from Linden Yañez MD sent at 3/4/2020  4:35 PM CST -----  Please call let her know that her thyroid antibodies are high these are causing her thyroid to be overactive.  She needs to continue to see endocrinology.  She should continue the metoprolol and the methimazole.

## 2020-03-06 NOTE — TELEPHONE ENCOUNTER
Patient did not call back for 3/4/20 appointment. Patient is currently scheduled for 4/27/20. Contacted patient to offer appointment for this Monday, 3/9/20 with Dr. Macias at 9:00 am. Left voicemail for patient to contact clinic ASAP for [place] appointment availability.       Yue Nicholas RN  Endocrine Care Coordinator  [facility]

## 2020-03-06 NOTE — TELEPHONE ENCOUNTER
Patient did not call back. Appointment will be offered to another patient. Will await call back from patient.       Yue Nicholas RN  Endocrine Care Coordinator  [facility]

## 2020-03-09 NOTE — TELEPHONE ENCOUNTER
Again attempted to contact patient. There is cancellation for tomorrow (3/10/20) with Dr. Lee at 3:30 pm. Left voicemail for patient to contact our office.       Yue Nicholas RN  Endocrine Care Coordinator  [facility]

## 2020-03-10 NOTE — TELEPHONE ENCOUNTER
Patient did not return call for sooner Endocrine appointment for hyperthyroidism. Patient has been contacted 3 times for sooner appointments and have not received call back. Patient is scheduled for 4/27/20 with Dr. Lee.      Will send update to ordering provider, Dr. Linden Yañez, as FYI on unsuccessful attempts to get patient in for sooner appointments. Will ask that his clinic contact us if patient needs sooner appointment than 4/27/20.      Yue Nicholas, RN  Endocrine Care Coordinator  [facility]

## 2020-03-25 ENCOUNTER — TELEPHONE (OUTPATIENT)
Dept: INTERNAL MEDICINE | Facility: CLINIC | Age: 50
End: 2020-03-25

## 2020-03-25 NOTE — TELEPHONE ENCOUNTER
She should have a phone visit for antibiotics.  But her dentist still has to see her for emergency work. They are only closed for routine cares.

## 2020-03-25 NOTE — TELEPHONE ENCOUNTER
Tried to reach patient, left message for patient to call the clinic back.    Evie Abraham, [facility]

## 2020-03-25 NOTE — TELEPHONE ENCOUNTER
Reason for Call:  Medication or medication refill:    Do you use a [facility]?  Name of the pharmacy and phone number for the current request:  [facility] - [phone]    Name of the medication requested: antibiotic     Other request: requesting a antibiotic for a tooth that has broke off and she now has infection. She states that  her dental office is closed.      Can we leave a detailed message on this number? YES    Phone number patient can be reached at: Cell number on file:    Telephone Information:   Mobile [phone]       Best Time: any     Call taken on 3/25/2020 at 8:19 AM by Karmen English

## 2020-03-30 ENCOUNTER — TELEPHONE (OUTPATIENT)
Dept: INTERNAL MEDICINE | Facility: CLINIC | Age: 50
End: 2020-03-30

## 2020-03-30 ENCOUNTER — VIRTUAL VISIT (OUTPATIENT)
Dept: INTERNAL MEDICINE | Facility: CLINIC | Age: 50
End: 2020-03-30
Payer: COMMERCIAL

## 2020-03-30 DIAGNOSIS — R05.9 COUGH: ICD-10-CM

## 2020-03-30 DIAGNOSIS — S02.5XXA CLOSED FRACTURE OF TOOTH, INITIAL ENCOUNTER: Primary | ICD-10-CM

## 2020-03-30 DIAGNOSIS — J30.1 SEASONAL ALLERGIC RHINITIS DUE TO POLLEN: ICD-10-CM

## 2020-03-30 DIAGNOSIS — E05.90 THYROTOXICOSIS WITHOUT THYROID STORM, UNSPECIFIED THYROTOXICOSIS TYPE: ICD-10-CM

## 2020-03-30 DIAGNOSIS — E05.90 HYPERTHYROIDISM: ICD-10-CM

## 2020-03-30 DIAGNOSIS — F17.200 TOBACCO USE DISORDER: ICD-10-CM

## 2020-03-30 PROCEDURE — 99442 ZZC PHYSICIAN TELEPHONE EVALUATION 11-20 MIN: CPT | Performed by: INTERNAL MEDICINE

## 2020-03-30 RX ORDER — ALBUTEROL SULFATE 90 UG/1
2 AEROSOL, METERED RESPIRATORY (INHALATION) EVERY 6 HOURS PRN
Qty: 1 INHALER | Refills: 0 | Status: SHIPPED | OUTPATIENT
Start: 2020-03-30

## 2020-03-30 RX ORDER — METHIMAZOLE 10 MG/1
10 TABLET ORAL DAILY
Qty: 30 TABLET | Refills: 3 | Status: SHIPPED | OUTPATIENT
Start: 2020-03-30

## 2020-03-30 NOTE — PROGRESS NOTES
"Subjective     Alessia Ramirez is a 49 year old female who is being evaluated via a billable telephone visit.      The patient has been notified of following:     \"This telephone visit will be conducted via a call between you and your physician/provider. We have found that certain health care needs can be provided without the need for a physical exam.  This service lets us provide the care you need with a short phone conversation.  If a prescription is necessary we can send it directly to your pharmacy.  If lab work is needed we can place an order for that and you can then stop by our lab to have the test done at a later time.    If during the course of the call the physician/provider feels a telephone visit is not appropriate, you will not be charged for this service.\"     Physician has received verbal consent for a Telephone Visit from the patient? Yes    Alessia Ramirez complains of   Chief Complaint   Patient presents with     Ear Problem     right ear pain, throat pain on left side as well, Body aches, no fever. Infected tooth on right side Started Tuesday      She is back on the methimazole and her energy has lowered.    Two weeks ago was working on leaves and arms got sore. Was grinding her teeth and half her tooth broke off. Then had some allergies and congestion, not feeling like herself.    Right ear was sore, used a q tip.    Tooth that broke is on the lower right side.      Took one cetirizine last week.      She isn't taking toprol so will pull that off the list.      Throat is feeling better.      ALLERGIES  Patient has no known allergies.    Acute Illness   Acute illness concerns: Ear pain  Onset: Tuesday    Fever: no    Chills/Sweats: no    Headache (location?): no    Sinus Pressure:no    Conjunctivitis:  no    Ear Pain: YES: right    Rhinorrhea: no    Congestion: no    Sore Throat: YES- right side of throat     Cough: YES - smokers cough    Wheeze: no    Decreased Appetite: no    Nausea: " no    Vomiting: no    Diarrhea:  no    Dysuria/Freq.: no    Fatigue/Achiness: YES    Sick/Strep Exposure: no     Therapies Tried and outcome: peroxide rinse for recent tooth infection. Was taking ibuprofen for pain but stopped due to covid information          Reviewed and updated as needed this visit by Provider         Review of Systems   ROS COMP: CONSTITUTIONAL: NEGATIVE for fever, chills, change in weight  INTEGUMENTARY/SKIN: NEGATIVE for worrisome rashes, moles or lesions  ENT/MOUTH: right tooth broken  RESP: NEGATIVE for significant cough or SOB  CV: NEGATIVE for chest pain, palpitations or peripheral edema  MUSCULOSKELETAL: NEGATIVE for significant arthralgias or myalgia  NEURO: NEGATIVE for weakness, dizziness or paresthesias  ENDOCRINE: NEGATIVE for temperature intolerance, skin/hair changes  HEME: NEGATIVE for bleeding problems  PSYCHIATRIC: anxiety       Objective   Reported vitals:  There were no vitals taken for this visit.   healthy, alert and no distress  Psych: Alert and oriented times 3; coherent speech, normal   rate and volume, able to articulate logical thoughts, able   to abstract reason, no tangential thoughts, no hallucinations   or delusions  Her affect is slight anxious     Diagnostic Test Results:  Labs reviewed in Epic  Labs ordered for her thyroid.        Assessment/Plan:  1. Cough  Will refill her inhaler, chronic cough, needs to stop smoking,   - albuterol (PROAIR HFA/PROVENTIL HFA/VENTOLIN HFA) 108 (90 Base) MCG/ACT inhaler; Inhale 2 puffs into the lungs every 6 hours as needed for shortness of breath / dyspnea or wheezing  Dispense: 1 Inhaler; Refill: 0    2. Closed fracture of tooth, initial encounter  Does not sound infected, she has called dentist, needs tooth pulled. Will not use antibiotics right now.     3. Thyrotoxicosis without thyroid storm, unspecified thyrotoxicosis type  On methimazole and energy is probaby back to baseline, will check tsh and cbc. Endocrinology appt in  late April   - **TSH with free T4 reflex FUTURE anytime; Future  - **CBC with platelets FUTURE anytime; Future    4. Tobacco use disorder  Needs to quit    Seasonal allergies, she can use otc cetirizine.      No follow-ups on file.      Phone call duration:  13 minutes    Linden Yañez MD

## 2020-04-08 ENCOUNTER — TELEPHONE (OUTPATIENT)
Dept: INTERNAL MEDICINE | Facility: CLINIC | Age: 50
End: 2020-04-08

## 2020-04-08 DIAGNOSIS — J30.1 SEASONAL ALLERGIC RHINITIS DUE TO POLLEN: Primary | ICD-10-CM

## 2020-04-08 RX ORDER — CETIRIZINE HYDROCHLORIDE 10 MG/1
10 TABLET ORAL DAILY
Qty: 30 TABLET | Refills: 1 | Status: SHIPPED | OUTPATIENT
Start: 2020-04-08

## 2020-04-08 NOTE — TELEPHONE ENCOUNTER
I sent zyrtec for her to [facility], otherwise she can buy it over the counter.

## 2020-04-08 NOTE — TELEPHONE ENCOUNTER
Reason for Call:  Medication or medication refill:    Do you use a [facility]?  Name of the pharmacy and phone number for the current request:  [facility] - [phone]    Name of the medication requested: allergy medication     Other request: patient calling states  Dr. Yañez was going to give her an Rx for her allergies, Patient is now out of hers. Please call to advise     Can we leave a detailed message on this number? YES    Phone number patient can be reached at: Home number on file [phone] (home)    Best Time: any    Call taken on 4/8/2020 at 12:39 PM by Becky rAgueta

## 2020-04-08 NOTE — TELEPHONE ENCOUNTER
Called patient and relayed information.  No further action is needed as of right now.    Cristal Medrano, CMA

## 2020-10-08 ENCOUNTER — NURSE TRIAGE (OUTPATIENT)
Dept: INTERNAL MEDICINE | Facility: CLINIC | Age: 50
End: 2020-10-08

## 2020-10-08 NOTE — TELEPHONE ENCOUNTER
Patient states she DID have vomiting of coffee ground type vomit, but does not now.  She states she will go to ED if this happens again.  She has taken Pepto Bismol now, so is informed she may have black stool, but if the black stools continue or she gets lightheaded due to blood loss, she will need to be seen in the ED.  She does not want to come to clinic to be seen, but states she will go to the ED if she has continued symptoms.  Currently she has none except her stomach is a bit upset.  She will treat with OTC medications.    Reason for Disposition    Vomiting red blood or black (coffee ground) material    Additional Information    Negative: Vomiting occurs only while coughing    Negative: Pregnant < 20 Weeks and nausea/vomiting began in early pregnancy (i.e., 4-8 weeks pregnant)    Negative: Chest pain    Negative: Headache is main symptom    Negative: Shock suspected (e.g., cold/pale/clammy skin, too weak to stand, low BP, rapid pulse)    Negative: Difficult to awaken or acting confused (e.g., disoriented, slurred speech)    Negative: Sounds like a life-threatening emergency to the triager    Protocols used: VOMITING-A-OH

## 2020-10-08 NOTE — TELEPHONE ENCOUNTER
"Reason for Call:  Other     Detailed comments: had some dental impressions done yesterday.  She started vomiting last night until present she has been vomiting what she describes as \"brown water\".  She talked to the dentist and he recommended that she call and talk to her primary.  She also talked about ovary trouble as she is going into menopause.  She is wondering if it could be related to that.  She is aware Dr. Yañez is out.  She is asking to talk to someone about this.  Please call, she states is afraid to come in.    Phone Number Patient can be reached at: Home number on file [phone] (home)       Best Time: any    Can we leave a detailed message on this number? YES    Call taken on 10/8/2020 at 9:35 AM by Steven Guidry      "

## 2020-10-08 NOTE — TELEPHONE ENCOUNTER
Patient Contact    Attempt # 1    Was call answered?  No.  Left message on voicemail with information to call me back. [phone]...................KARLA Harris

## 2021-03-15 NOTE — TELEPHONE ENCOUNTER
Alessia returns call and message below is relayed.  Alessia states understanding and had no other questions or concerns.  
Called patient and left a voicemail to call the clinic back, when she calls back please relay the message below from Dr. Yañez.       Cristal Medrano, CMA   
I have attempted to contact pt with message below. I left a message for her to return my call. Please give pt message below when call is returned. Lucrecia Schmitt CMA ([facility])    
I have attempted to contact the pt with message below. I left a message for her to return my call. Please give pt message below when call is returned. Lucrecia Schmitt CMA ([facility])    
Reason for Call:  Other appointment    Detailed comments: Patient had an appointment scheduled for today with Dr. Yañez but states she works overnights and felt really tired so she changed her appointment to next Monday 8/21 at 1:00 pm with Dr. Yañez. She knows that she had the appointment to discuss her thyroid results. She said she wasn't sure if this was something that could wait for next week. She said that she wasn't sure if he wanted to discuss taking it out or switching medication. She said if there was something important that he could call her and discuss or if she needs to be seen sooner that appointment around 9:00 am work better for her. She also said she still doesn't have the number for the specialist Dr. Yañez had recommneded her to speak to but she couldn't remember who the specialist was. Please advise.     Phone Number Patient can be reached at: Home number on file [phone] (home)    Best Time: any     Can we leave a detailed message on this number? YES    Call taken on 8/14/2017 at 10:12 AM by Mariot Malin      
Yes her thyroid is over active still, it can wait till next week but needs to keep her visit.    
Statement Selected

## 2021-06-13 ENCOUNTER — NURSE TRIAGE (OUTPATIENT)
Dept: NURSING | Facility: CLINIC | Age: 51
End: 2021-06-13

## 2021-06-13 NOTE — TELEPHONE ENCOUNTER
"  \"I have been having abdominal pain for 3 days. It comes on about every 5 minutes When I have a bowel movement it's pure water. No vomiting or fever. Last night I took an antacid and Gas X. It's helped some but the pain is still sharp every 5 minutes, mostly on the left side of my abdomen.\"  Denies other sx.   Triaged and advised to be seen within 4 hours, or appt in am 6/14 with PCP  Mitzy Mason RN [facility]    COVID 19 Nurse Triage Plan/Patient Instructions    Please be aware that novel coronavirus (COVID-19) may be circulating in the community. If you develop symptoms such as fever, cough, or SOB or if you have concerns about the presence of another infection including coronavirus (COVID-19), please contact your health care provider or visit [email or web address].     Disposition/Instructions    In-Person Visit with provider recommended. Reference Visit Selection Guide.    Thank you for taking steps to prevent the spread of this virus.  o Limit your contact with others.  o Wear a simple mask to cover your cough.  o Wash your hands well and often.    Resources    [facility]: About COVID-19: [email or web address]    CDC: What to Do If You're Sick: www.cdc.gov/coronavirus/2019-ncov/about/steps-when-sick.html    CDC: Ending Home Isolation: www.cdc.gov/coronavirus/2019-ncov/hcp/disposition-in-home-patients.html     CDC: Caring for Someone: www.cdc.gov/coronavirus/2019-ncov/if-you-are-sick/care-for-someone.html     [facility]: Interim Guidance for Hospital Discharge to Home: www.health.[facility].[place].us/diseases/coronavirus/hcp/hospdischarge.pdf    [facility] clinical trials (COVID-19 research studies): [email or web address].[email or web address]/umn-clinical-trials     Below are the COVID-19 hotlines at the [place] Department of Health ([facility]). Interpreters are available.   o For health questions: Call 706-226-3689 or 1-187.584.3297 (7 a.m. to 7 p.m.)  o For questions about schools and " "childcare: Call 441-290-6137 or 1-498.705.2359 (7 a.m. to 7 p.m.)                   Reason for Disposition    [1] MILD-MODERATE pain AND [2] constant AND [3] present > 2 hours    Additional Information    Negative: [1] SEVERE pain (e.g., excruciating) AND [2] present > 1 hour    Negative: [1] SEVERE pain AND [2] age > 60    Negative: [1] Vomiting AND [2] contains red blood or black (\"coffee ground\") material  (Exception: few red streaks in vomit that only happened once)    Negative: Blood in bowel movements   (Exception: blood on surface of BM with constipation)    Negative: Black or tarry bowel movements  (Exception: chronic-unchanged  black-grey bowel movements AND is taking iron pills or Pepto-bismol)    Negative: Patient sounds very sick or weak to the triager    Protocols used: ABDOMINAL PAIN - FEMALE-A-AH      "

## 2022-12-13 ENCOUNTER — TELEPHONE (OUTPATIENT)
Dept: INTERNAL MEDICINE | Facility: CLINIC | Age: 52
End: 2022-12-13